# Patient Record
(demographics unavailable — no encounter records)

---
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
## 2017-01-14 NOTE — HISTORY AND PHYSICAL
History of Present Illness


General


Date patient seen:  Jan 14, 2017


Reason for Hospitalization:  Syncope





Present Illness


HPI


69 year old male with hx of HIV, HTN, chronic subdural hematoma, presented to 

ER with complaints of several syncopal episodes


These episodes appear to be related to patient's acute pain and blood pressure.

  He had same type of episode one year ago when his


BP was too low because of hypertensive meds.


Denies any vomiting or diarrhea, He has had decreased oral intake


Denies any chest pain.


He did have near syncopal episode as well feeling lightheaded and dizzy.


Pt is admitted to telemetry for further work up.


Allergies:  


Coded Allergies:  


     ACETAMINOPHEN (Verified  Allergy, Unknown, 3/24/10)


     ASPIRIN (Verified  Allergy, Unknown, 12/7/09)


     HYDROCODONE (Verified  Allergy, Unknown, 3/24/10)


     MORPHINE (Verified  Allergy, Unknown, 12/7/09)





Medication History


Scheduled


Atenolol* (Tenormin*), 50 MG ORAL DAILY, (Reported)


Atenolol* (Tenormin*), 25 MG ORAL DAILY


Clopidogrel Bisulfate* (Plavix*), 75 MG ORAL DAILY


Diazepam* (Valium*), 10 MG PO DAILY, (Reported)


Emtricitabine/Tenofovir (Truvada 200 mg-300 mg Tablet), 1 TAB ORAL DAILY, (

Reported)


Levofloxacin* (Levaquin*), 500 MG ORAL DAILY


Nevirapine (Viramune), 200 MG PO DAILY, (Reported)


Oxycodone Hcl Er* (Oxycontin*), 80 MG ORAL BID, (Reported)


Promethazine Hcl* (Phenergan*), 25 MG ORAL Q6H





Scheduled PRN


Codeine/Promethazine Hcl* (Promethazine-Codeine Syrup*), 5 ML ORAL Q4H PRN for 

For Cough





Patient History


Healthcare decision maker





Resuscitation status





Advanced Directive on File








Past Medical/Surgical History


Past Medical/Surgical History:  


(1) Syncope


(2) Hypertension


(3) Lumbar herniated disc


(4) HIV disease


(5) Acute encephalopathy


(6) Intractable back pain


(7) Chronic subdural hematoma





Review of Systems


Constitutional:  Reports: malaise, weakness





Physical Exam


General Appearance:  cachetic


HEENT:  normocephalic, atraumatic


Neck:  non-tender, normal alignment


Respiratory/Chest:  chest wall non-tender, lungs clear


Cardiovascular/Chest:  normal peripheral pulses, normal rate


Abdomen:  normal bowel sounds, non tender


Genitourinary/Rectal:  normal genital exam


Extremities:  normal range of motion, non-pitting





Last 24 Hour Vital Signs








  Date Time  Temp Pulse Resp B/P Pulse Ox O2 Delivery O2 Flow Rate FiO2


 


1/14/17 15:18 98.1 91 23 91/68 96 Room Air  


 


1/14/17 14:26  92 14 95/63 97 Room Air  


 


1/14/17 13:17 98.8 116 18 92/61 96 Room Air  











Laboratory Tests








Test


  1/14/17


13:35


 


White Blood Count


  15.5 K/UL


(4.8-10.8)  H


 


Red Blood Count


  4.60 M/UL


(4.70-6.10)  L


 


Hemoglobin


  16.1 G/DL


(14.2-18.0)


 


Hematocrit


  47.8 %


(42.0-52.0)


 


Mean Corpuscular Volume


  104 FL (80-99)


H


 


Mean Corpuscular Hemoglobin


  35.0 PG


(27.0-31.0)  H


 


Mean Corpuscular Hemoglobin


Concent 33.7 G/DL


(32.0-36.0)


 


Red Cell Distribution Width


  11.4 %


(11.6-14.8)  L


 


Platelet Count


  249 K/UL


(150-450)


 


Mean Platelet Volume


  5.9 FL


(6.5-10.1)  L


 


Neutrophils (%) (Auto)


  72.4 %


(45.0-75.0)


 


Lymphocytes (%) (Auto)


  19.2 %


(20.0-45.0)  L


 


Monocytes (%) (Auto)


  7.7 %


(1.0-10.0)


 


Eosinophils (%) (Auto)


  0.2 %


(0.0-3.0)


 


Basophils (%) (Auto)


  0.6 %


(0.0-2.0)


 


Sodium Level


  134 mEQ/L


(135-145)  L


 


Potassium Level


  3.8 mEQ/L


(3.4-4.9)


 


Chloride Level


  92 mEQ/L


()  L


 


Carbon Dioxide Level


  23 mEQ/L


(20-30)


 


Anion Gap 19 (5-15)  H


 


Blood Urea Nitrogen


  29 mg/dL


(7-23)  H


 


Creatinine


  2.1 mg/dL


(0.7-1.2)  H


 


Estimat Glomerular Filtration


Rate 38.2 mL/min


(>60)


 


Glucose Level


  104 mg/dL


()


 


Calcium Level


  9.3 mg/dL


(8.6-10.2)


 


Total Bilirubin


  0.5 mg/dL


(0.0-1.2)


 


Aspartate Amino Transf


(AST/SGOT) 90 U/L (5-40)


H


 


Alanine Aminotransferase


(ALT/SGPT) 79 U/L (3-41)


H


 


Alkaline Phosphatase


  67 U/L


()


 


Total Creatine Kinase


  227 U/L


()  H


 


Creatine Kinase MB


  3.7 ng/mL (<


6.7)


 


Creatine Kinase MB Relative


Index 1.6  


 


 


Troponin I


  < 0.30 ng/mL


(<=0.30)


 


Total Protein


  7.3 g/dL


(6.6-8.7)


 


Albumin


  4.3 g/dL


(3.5-5.2)


 


Globulin 3.0 g/dL  


 


Albumin/Globulin Ratio 1.4 (1.0-2.7)  








Height (Feet):  5


Height (Inches):  11.00


Weight (Pounds):  107


Medications





Current Medications








 Medications


  (Trade)  Dose


 Ordered  Sig/Melania


 Route


 PRN Reason  Start Time


 Stop Time Status Last Admin


Dose Admin


 


 Acetaminophen


  (Tylenol)  650 mg  Q4H  PRN


 ORAL


 fever  1/14/17 16:00


 2/13/17 15:59 UNV  


 


 


 Albuterol/


 Ipratropium 3 ml  3 ml  EVERY 4 HOURS  PRN


 HHN


 Shortness of Breath  1/14/17 16:00


 1/19/17 15:59 UNV  


 


 


 Atenolol


  (Tenormin)  25 mg  DAILY


 ORAL


   1/15/17 09:00


 2/14/17 08:59 UNV  


 


 


 Cefepime HCl 2 gm/


 Dextrose  100 ml @ 


 100 mls/hr  EVERY 12  HOURS


 IV


   1/14/17 21:00


 1/21/17 20:59 UNV  


 


 


 Diazepam


  (Valium)  10 mg  DAILY


 ORAL


   1/15/17 09:00


 1/22/17 08:59 UNV  


 


 


 Emtricitabine/


 Tenofovir


  (Truvada)  1 tab  DAILY


 ORAL


   1/15/17 09:00


 2/14/17 08:59 UNV  


 


 


 Heparin Sodium


  (Porcine)


  (Heparin 5000


 units/ml)  5,000 units  EVERY 12  HOURS


 SUBQ


   1/14/17 21:00


 2/13/17 20:59 UNV  


 


 


 Morphine Sulfate


  (Morphine


 Sulfate)  2 mg  EVERY 4 HOURS  PRN


 IVP


 Moderate Pain (Pain Scale 4-6)  1/14/17 16:00


 1/21/17 15:59 UNV  


 


 


 Nevirapine


  (Viramune)  200 mg  DAILY


 ORAL


   1/15/17 09:00


 2/14/17 08:59 UNV  


 


 


 Nitroglycerin


  (Ntg)  0.4 mg  Every 5 Minutes  PRN


 SL


 Prn Chest Pain  1/14/17 16:00


 2/13/17 15:59 UNV  


 


 


 Ondansetron HCl


  (Zofran)  4 mg  Q6H  PRN


 IVP


 Nausea & Vomiting  1/14/17 16:00


 2/13/17 15:59 UNV  


 


 


 Oxycodone HCl


  (OxyCONTIN)  80 mg  BID


 ORAL


   1/14/17 18:00


 1/21/17 17:59 UNV  


 


 


 Polyethylene


 Glycol


  (Miralax)  17 gm  DAILYPRN  PRN


 ORAL


 Constipation  1/14/17 16:00


 2/13/17 15:59 UNV  


 


 


 Promethazine HCl


 25 mg  25 mg  Q6H


 ORAL


   1/14/17 16:00


 2/13/17 15:59 UNV  


 


 


 Promethazine HCl/


 Codeine


  (Phenergan with


 Codeine)  5 ml  Q4H  PRN


 ORAL


 For Cough  1/14/17 16:00


 2/13/17 15:59 UNV  


 


 


 Sodium Chloride


  (Sodium Chloride


 1000ml bag)  1,000 ml @ 


 50 mls/hr  Q20H


 IVLG


   1/15/17 00:21


 2/14/17 00:20 UNV  


 


 


 Temazepam


  (Restoril)  15 mg  HSPRN  PRN


 ORAL


 Insomnia  1/14/17 16:00


 1/21/17 15:59 UNV  


 


 


 Vancomycin HCl/


 Dextrose


  (Vancomycin/D5W


 250ml)  275 ml @ 


 183.3 mls/


 hr  Q24H


 IV


   1/15/17 00:30


 1/20/17 00:29 UNV  


 











Assessment/Plan


Problem List:  


(1) Acute encephalopathy


ICD Codes:  G93.40 - Encephalopathy, unspecified


SNOMED:  7072143


(2) Hypertension


ICD Codes:  I10 - Hypertension


SNOMED:  56492571


Qualifiers:  


   Qualified Codes:  I10 - Essential (primary) hypertension


(3) HIV disease


ICD Codes:  B20 - HIV disease


SNOMED:  20422387


(4) Intractable back pain


ICD Codes:  M54.9 - Intractable back pain


SNOMED:  264377250


(5) Chronic subdural hematoma


ICD Codes:  I62.03 - Nontraumatic chronic subdural hemorrhage


SNOMED:  21613316


(6) Lumbar herniated disc


ICD Codes:  M51.26 - Lumbar herniated disc


SNOMED:  058484172


Assessment/Plan


telemetry monitoring


BP monitoring


Neuro evaluation


serial ekg, troponin


echo


pain control











BELLE BATISTA Jan 14, 2017 16:06

## 2017-01-15 NOTE — CONSULTATION
Consult Note


Consult Note





ID CONSULT:  Dict# [id number]


Assessment/Plan





ASSESSMENT:  70 y/o male with:





// HIV(+), on cART ( truvada, nevirapine ) - CD4 382(19%), VL undetectable 3/

2016


// Leukocytosis - resolved, afebrile.  Suspect stress response, dehydration, 

possible rib fracture. No localizing s/sx infection








// Syncope SP fall - troponin neg x1


    - MRI 4/2016: No acute infarct. Possible small infarct involving the left 

lateral cerebellar hemisphere, unchanged. Stable extra-axial fluid collections 

overlying the frontoparietal lobes bilaterally, possibly chronic subdural 

hygromas. Chronic microangiopathic ischemic changes. Age-related atrophy.


// Left anterior hemithorax pain r/o rib fracture - CXR pending


// Elevated LFTs / transaminitis - mild, asymptomatic, h/o gallstones, HCV(-) 

2015


// ARF - improved


// Chronic LBP, lumbar stenosis


// Negative coccidioides serology, CrAg 2009


// Negative MRSA screen


// No ABX allergies


// Full Code








PLAN:





- DC IV vancomycin, cefepime d# 1, monitor pt off of ABX. Doubt active 

infectious process


- continue cART ( truvada, nevirapine )


- f/u CXR


- monitor CBC, temperatures, panculture if acute change


- monitor BMP





Thanks!  WIll follow











NEETA CASILLAS Brandon 15, 2017 09:16

## 2017-01-15 NOTE — CARDIOLOGY REPORT
--------------- APPROVED REPORT --------------





EKG Measurement

Heart [id number]

MA 140P75

[id number]

[id number]

[id number]





Sinus tachycardia

Otherwise normal ECG

## 2017-01-15 NOTE — NEUROLOGY PROGRESS NOTE
Objective


Physical Exam





Last Vital Signs








  Date Time  Temp Pulse Resp B/P Pulse Ox O2 Delivery O2 Flow Rate FiO2


 


1/15/17 12:28 97.2 58 18 106/69 99 Room Air  











Laboratory Tests








Test


  1/15/17


05:50


 


White Blood Count


  9.1 K/UL


(4.8-10.8)


 


Red Blood Count


  3.97 M/UL


(4.70-6.10)  L


 


Hemoglobin


  13.8 G/DL


(14.2-18.0)  L


 


Hematocrit


  41.7 %


(42.0-52.0)  L


 


Mean Corpuscular Volume


  105 FL (80-99)


H


 


Mean Corpuscular Hemoglobin


  34.8 PG


(27.0-31.0)  H


 


Mean Corpuscular Hemoglobin


Concent 33.1 G/DL


(32.0-36.0)


 


Red Cell Distribution Width


  11.4 %


(11.6-14.8)  L


 


Platelet Count


  191 K/UL


(150-450)


 


Mean Platelet Volume


  6.2 FL


(6.5-10.1)  L


 


Neutrophils (%) (Auto)


  55.8 %


(45.0-75.0)


 


Lymphocytes (%) (Auto)


  33.4 %


(20.0-45.0)


 


Monocytes (%) (Auto)


  8.3 %


(1.0-10.0)


 


Eosinophils (%) (Auto)


  1.7 %


(0.0-3.0)


 


Basophils (%) (Auto)


  0.8 %


(0.0-2.0)


 


Sodium Level


  138 mEQ/L


(135-145)


 


Potassium Level


  4.1 mEQ/L


(3.4-4.9)


 


Chloride Level


  97 mEQ/L


()  L


 


Carbon Dioxide Level


  28 mEQ/L


(20-30)


 


Anion Gap 13 (5-15)  


 


Blood Urea Nitrogen


  35 mg/dL


(7-23)  H


 


Creatinine


  1.7 mg/dL


(0.7-1.2)  H


 


Estimat Glomerular Filtration


Rate 48.7 mL/min


(>60)


 


Glucose Level


  91 mg/dL


()


 


Calcium Level


  8.5 mg/dL


(8.6-10.2)  L


 


Total Bilirubin


  0.2 mg/dL


(0.0-1.2)


 


Aspartate Amino Transf


(AST/SGOT) 36 U/L (5-40)  


 


 


Alanine Aminotransferase


(ALT/SGPT) 49 U/L (3-41)


H


 


Alkaline Phosphatase


  64 U/L


()


 


Total Protein


  5.9 g/dL


(6.6-8.7)  L


 


Albumin


  3.4 g/dL


(3.5-5.2)  L


 


Globulin 2.5 g/dL  


 


Albumin/Globulin Ratio 1.3 (1.0-2.7)  











Impression/Recommendations


Recommendations


#[id number]











JONNIE HOFFMAN Brandon 15, 2017 14:06

## 2017-01-16 NOTE — CONSULTATION
DATE OF CONSULTATION:  01/15/2017



INFECTIOUS DISEASE CONSULTATION:



CONSULTING PHYSICIAN:  Paco Ramos M.D.



REFERRING PHYSICIAN:  Adriana Arreola M.D.



REASON FOR CONSULTATION:  Human immunodeficiency virus and

leukocytosis.



HISTORY OF PRESENT ILLNESS:  This is a 69-year-old male with a

history of human immunodeficiency virus virologically suppressed with

anti-retroviral therapy.  Admitted on 01/14/2017 with syncope, status post

fall.  The patient fell on his left hemithorax and is complaining of

anterior rib pain.  A chest x-ray is pending.  The patient attributes

syncope to hypotension and antihypertensive use.  Denied fevers, chills,

night sweats, headache, dizziness, cough, congestion, abdominal pain,

nausea, vomiting, diarrhea, or dysuria.  Evidence of leukocytosis, now

resolved, acute renal insufficiency, improved, and elevated LFTs with

transaminitis, improved.  No cultures has been sent.  The patient has been

started on empiric vancomycin and cefepime.  His antiretroviral will be

continued.  ID now consulted to assist in management.



PAST MEDICAL HISTORY:

1. Human immunodeficiency virus, virologically suppressed with Truvada,

nevirapine.  CD4 count was 382 in March of 2016.

2. Lumbar stenosis.

3. Chronic low back pain.

4. Chronic bilateral frontoparietal subdural hygromas.

5. Grade 1 diastolic dysfunction.

6. Diverticulosis.

7. Cholelithiasis.

8. Atherosclerosis.



PAST SURGICAL HISTORY:  Spinal surgery.



FAMILY HISTORY:  Noncontributory.



SOCIAL HISTORY:  No active tobacco, alcohol, or illicit drug abuse.



ALLERGIES:

1. Aspirin.

2. Tylenol.

3. Morphine.

4. Hydrocodone.



MEDICATIONS:

1. Vancomycin day #1.

2. Cefepime day #1.

3. Truvada.

4. Nevirapine.

5. Atenolol.

6. Valium.

7. Subcutaneous heparin.



REVIEW OF SYSTEMS:  As per history of present illness.  Ten systems

reviewed.  All pertinent positives and negatives noted.



PHYSICAL EXAMINATION:

VITAL SIGNS:  Maximum temperature 98.8, blood pressure 103/63, heart

rate 70, respiratory rate 18, and saturating 95% on room air.

GENERAL:  No apparent distress.  Nontoxic appearing.

HEENT:  No thrush.  Edentulous in the maxillary and poor dentition in

the mandible.  No carotid bruit.

CARDIOVASCULAR:  Regular rate and rhythm.  No murmurs.

PULMONARY:  Clear to auscultation bilaterally.

ABDOMINAL:  Bowel sounds present.  Soft, nondistended.  Left anterior

hemithorax rib pain.

EXTREMITIES:  No edema.

SKIN:  No rash.



LABORATORY DATA:  White blood cell count 9.1 decreased from 15.5,

hemoglobin 13.8, and platelets 191,000.  Sodium 138, potassium 4.1,

chloride 97, bicarbonate 28, BUN 35, creatinine 1.7 decreased from 2.1.

AST 36, ALT 49, alkaline phosphatase 64, total bilirubin 0.2, albumin 3.4.

Troponin negative x1.



MICROBIOLOGY:  None.



IMAGING:  On 01/14/2017, chest x-ray pending.



ASSESSMENT:

1. Human immunodeficiency virus positive, on antiretroviral therapy with

Truvada, nevirapine and a CD4 count of 382, and virologically undetectable

as of March of 2016.

2. Leukocytosis, resolved and afebrile.  Suspect stress response,

dehydration, and possible rib fracture.  No localizing signs or symptoms

of infection.

3. Syncope, status post fall.  Troponins are negative x1.  MRI in April, 2015 showed a possible small infarct in the left lateral cerebellar

hemisphere and stable extra-axial fluid collections overlying the frontal

parietal lobes bilaterally, chronic microangiopathic ischemic changes and

age-related atrophy.

4. Left anterior hemithorax pain, rule out rib fracture.  Chest x-ray is

pending.

5. Elevated liver function tests versus transaminitis, mild and

asymptomatic.  He has a history of gallstones and hepatitis C serology was

negative in 2015.

6. Acute renal failure, improved.

7. Chronic low back pain and lumbar stenosis.

8. Negative Coccidioides and cryptococcal antigen in 2009.

9. Negative methicillin-resistant Staphylococcus aureus screen.

10. No antibiotic allergies.

11. Full Code.



PLAN:

1. Discontinue intravenous vancomycin and cefepime and monitor the

patient off of antibiotics.  Doubt active infectious process.

2. Continue Ensure via therapy with Truvada and nevirapine.

3. Follow up chest x-ray.

4. Monitor CBC and temperatures and panculture if acute change.

5. Monitor BMP.



Thank you.  We will follow.









  ______________________________________________

  Paco Ramos M.D.





DR:  KALI

D:  01/15/2017 09:37

T:  01/16/2017 05:25

JOB#:  [id number]

CC:  Adriana Arreola M.D.; Fax#:  [phone] Smith, M.D.
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
## 2017-01-17 NOTE — DISCHARGE SUMMARY
Discharge Summary


Hospital Course


Date of Admission


Jan 14, 2017 at 14:07


Date of Discharge


Jan 16, 2017 at 13:56


Admitting Diagnosis


SYNCOPE


HPI


Anupam Ramos is a 69 year old male who was admitted on Jan 14, 2017 at 14:07 

for Syncope


Hospital Course


dc summary dictated #[id number]





Discharge Medications


Continued Medications:  


Clopidogrel Bisulfate* (Plavix*) 75 Mg Tablet


75 MG ORAL DAILY, #30 TAB





Codeine/Promethazine Hcl* (Promethazine-Codeine Syrup*) 5 Ml Udc


5 ML ORAL Q4H PRN for For Cough, #12 OZ





Diazepam* (Valium*) 5 Mg Tablet


10 MG PO DAILY, #21 TAB





Emtricitabine/Tenofovir (Truvada 200 mg-300 mg Tablet) 1 Tab Tab


1 TAB ORAL DAILY





Nevirapine (Viramune) 200 Mg Tablet


200 MG PO DAILY





Oxycodone Hcl Er* (Oxycontin*) 80 Mg Tab.er.12h


80 MG ORAL BID, TAB











Discharge


Condition Upon Discharge:  improving, stable


Discharge Disposition


Patient was discharged to Home (01)


Discharge Diagnoses:  





Discharge Instructions


Discharge Instructions


Special Instructions


I have been assigned to complete a D/C Summary on this account. I was not 

involved in the patient management











Natalie Govea NP ([facility]) Jan 17, 2017 13:05

## 2017-01-18 NOTE — DISCHARGE SUMMARY
DATE OF ADMISSION:  01/14/2017



DATE OF DISCHARGE:  01/16/2017



ADMITTING DOCTOR:  Adriana Arreola M.D.



REASON FOR HOSPITALIZATION:  69-year-old male with

history of human immunodeficiency virus, hypertension, and other multiple

medical issues presented to ED.  He was reported to be fairly well until the 
last few days

when he started to develop generalized weakness and   had few episodes

of falls, which he attributed to clumsiness and slipping.  However, on the 
third event, he

lost consciousness and fell down hitting his ribs.  When he woke up, there

was no tongue biting and no incontinence.  He was brought to the emergency

room for further evaluation.  Vital signs on admission were with evidence

of tachycardia -116 and blood pressure was -92/61.  He was afebrile.

Laboratory work  revealed elevated WBC-15.5;  BUN was 29 and creatinine 2.1.  
Anion gap 19.

Elevated liver enzymes:  AST 90, ALT 79, and total cholesterol of 227.

Sodium 134.  In the emergency room, the patient's troponin was negative.

Chest x-ray revealed no acute disease.

The patient was admitted for further management due to the leukocytosis

and workup for syncope. 



ADMITTING DIAGNOSES:   



1. Syncope.

2. Leukocytosis.

3. Acute renal failure.

4. Human immunodeficiency virus status.

5. Elevated transaminase  

6. Intractable low back pain.



HOSPITAL COURSE:  The patient is admitted to telemetry floor.

The patient was started on IV hydration.  Nephrologist, neurologist, and ID all

seen the patient.  Troponin was negative.  EKG revealed no ST changes.  No 
ectopy.

Antihypertensive medications were discontinued.  No evidence of arrhythmia on 
the EKG or monitor.

Plavix was continued.  No aspirin since the patient   allergic to it, and no 
statin for now 

since the patient had  elevated LFT.

The patient was hydrated. Renal parameters improved slightly with

hydration.  Creatinine down to 1.7.  Nephrologist followed the patient.

Initially, the patient was on antibiotics.  ID discontinued antibiotics.

Leukocytosis resolved.  No fever.  ID doubt any acute infectious process

and recommended to observe the patient off antibiotics.  HAART therapy was

resumed for HIV.  The patient was working with physical and occupational

therapy.  Per Neurology, new onset of recurrent syncope likely secondary

to drug-induced hypotension.  This, as mentioned above, all antihypertensive

medication were discontinued.  The patient initially exhibited  some change   in

mental status,  likely secondary to dehydration, which resolved after gentle 
hydration.   

LFT monitored, patient with history of hepatitis C, no statin for now.  

Mental status back to baseline, renal parameters improved, blood pressure stable
, all antihypertensive medications on hold. 

Patient was cleared for  discharge . 



FINAL DIAGNOSES:   



1. Acute encephalopathy likely related to dehydration, resolved.

2. New onset of recurrent syncope, status post fall, likely secondary to

drug-induced hypotension.

3. Acute renal failure on chronic kidney disease, likely secondary to

dehydration  , improving.

4. Human immunodeficiency virus status, on HAART therapy.

5. Elevated liver function tests, monitored, trending down.

6. Intractable back pain, opioid dependency.

7. Lumbar herniated disc, status post laminotomy.

8. Lumbar stenosis.

9. Chronic subdural hematoma.



DISCHARGE MEDICATIONS:  See medication reconciliation list.



DISCHARGE INSTRUCTIONS:  The patient to follow up with the primary

medical doctor. patient will eventually need optimization of antihypertensive 
regimen, 

for now all antihypertensive medications on hold. Patient to see primary care 
provider in 1 week. 







  ______________________________________________

  Adriana Arreola M.D.



  ______________________________________________

  Natalie Govea ([facility] NCATHY





DR:  NILES

D:  01/17/2017 13:05

T:  01/18/2017 04:21

JOB#:  [id number]

CC:



BHANU
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
## 2018-01-26 NOTE — DIAGNOSTIC IMAGING REPORT
Indication: Cough

 

Technique: One view of the chest

 

Comparison: 1/14/2017

 

Findings: The lungs and pleural spaces are clear. The heart size is normal. The aorta

is tortuous and ectatic. The bones are unremarkable.

 

Impression: No acute process

## 2024-01-23 NOTE — PULMONOLOGY PROGRESS NOTE
Assessment/Plan


Problems:  


(1) Intractable back pain


(2) Acute encephalopathy


(3) Chronic subdural hematoma


(4) Lumbar herniated disc


(5) HIV disease


(6) Hypertension


Assessment/Plan





wants to be seen by Id disease


telemetry monitoring


BP monitoring


Neuro evaluation


serial ekg, troponin


echo


pain control


ok to dc home, off bp meds


f.u as outpatient





Subjective


Interval Events:  doing better


Allergies:  


Coded Allergies:  


     ACETAMINOPHEN (Verified  Allergy, Unknown, 3/24/10)


     ASPIRIN (Verified  Allergy, Unknown, 12/7/09)


     HYDROCODONE (Verified  Allergy, Unknown, 3/24/10)


     MORPHINE (Verified  Allergy, Unknown, 12/7/09)





Objective





Last 24 Hour Vital Signs








  Date Time  Temp Pulse Resp B/P Pulse Ox O2 Delivery O2 Flow Rate FiO2


 


1/15/17 12:28 97.2 58 18 106/69 99 Room Air  


 


1/15/17 12:03 96.9       


 


1/15/17 12:00  64      


 


1/15/17 09:13  75  103/63    


 


1/15/17 08:00  65      


 


1/15/17 08:00  75 18   Room Air  


 


1/15/17 07:50 96.9 56 18 103/63 95 Room Air  


 


1/15/17 05:00 97.0 85 20 95/59 95 Room Air  


 


1/15/17 04:27 97.0 85 20 85/54 95 Room Air  


 


1/15/17 04:00  77      


 


1/14/17 20:00 97.8 90 18 102/62 97 Room Air  


 


1/14/17 20:00  88      


 


1/14/17 19:26  96 18   Room Air  


 


1/14/17 16:25 97.2 69 19 104/75 96 Room Air  


 


1/14/17 16:12 98.1 89 21 97/69 97 Room Air  


 


1/14/17 15:18 98.1 91 23 91/68 96 Room Air  


 


1/14/17 14:26  92 14 95/63 97 Room Air  


 


1/14/17 13:17 98.8 116 18 92/61 96 Room Air  

















Intake and Output  


 


 1/14/17 1/15/17





 19:00 07:00


 


Intake Total 0 ml 1125.0 ml


 


Balance 0 ml 1125.0 ml


 


  


 


Intake Oral 0 ml 400 ml


 


IV Total  725.0 ml


 


# Voids  1








General Appearance:  cachetic


HEENT:  normocephalic, atraumatic


Respiratory/Chest:  chest wall non-tender, lungs clear


Cardiovascular:  normal peripheral pulses, normal rate


Abdomen:  normal bowel sounds, soft, non tender


Extremities:  no cyanosis


Neurologic/Psychiatric:  CNs II-XII grossly normal


Lymphatic:  no neck adenopathy


Laboratory Tests


1/14/17 13:35: 


White Blood Count 15.5H, Red Blood Count 4.60L, Hemoglobin 16.1, Hematocrit 47.8

, Mean Corpuscular Volume 104H, Mean Corpuscular Hemoglobin 35.0H, Mean 

Corpuscular Hemoglobin Concent 33.7, Red Cell Distribution Width 11.4L, 

Platelet Count 249, Mean Platelet Volume 5.9L, Neutrophils (%) (Auto) 72.4, 

Lymphocytes (%) (Auto) 19.2L, Monocytes (%) (Auto) 7.7, Eosinophils (%) (Auto) 

0.2, Basophils (%) (Auto) 0.6, Sodium Level 134L, Potassium Level 3.8, Chloride 

Level 92L, Carbon Dioxide Level 23, Anion Gap 19H, Blood Urea Nitrogen 29H, 

Creatinine 2.1H, Estimat Glomerular Filtration Rate 38.2, Glucose Level 104, 

Calcium Level 9.3, Total Bilirubin 0.5, Aspartate Amino Transf (AST/SGOT) 90H, 

Alanine Aminotransferase (ALT/SGPT) 79H, Alkaline Phosphatase 67, Total 

Creatine Kinase 227H, Creatine Kinase MB 3.7, Creatine Kinase MB Relative Index 

1.6, Troponin I < 0.30, Total Protein 7.3, Albumin 4.3, Globulin 3.0, Albumin/

Globulin Ratio 1.4


1/15/17 05:50: 


White Blood Count 9.1, Red Blood Count 3.97L, Hemoglobin 13.8L, Hematocrit 41.7L

, Mean Corpuscular Volume 105H, Mean Corpuscular Hemoglobin 34.8H, Mean 

Corpuscular Hemoglobin Concent 33.1, Red Cell Distribution Width 11.4L, 

Platelet Count 191, Mean Platelet Volume 6.2L, Neutrophils (%) (Auto) 55.8, 

Lymphocytes (%) (Auto) 33.4, Monocytes (%) (Auto) 8.3, Eosinophils (%) (Auto) 

1.7, Basophils (%) (Auto) 0.8, Sodium Level 138, Potassium Level 4.1, Chloride 

Level 97L, Carbon Dioxide Level 28, Anion Gap 13, Blood Urea Nitrogen 35H, 

Creatinine 1.7H, Estimat Glomerular Filtration Rate 48.7, Glucose Level 91, 

Calcium Level 8.5L, Total Bilirubin 0.2, Aspartate Amino Transf (AST/SGOT) 36, 

Alanine Aminotransferase (ALT/SGPT) 49H, Alkaline Phosphatase 64, Total Protein 

5.9L, Albumin 3.4L, Globulin 2.5, Albumin/Globulin Ratio 1.3





Current Medications








 Medications


  (Trade)  Dose


 Ordered  Sig/Melania


 Route


 PRN Reason  Start Time


 Stop Time Status Last Admin


Dose Admin


 


 Acetaminophen


  (Tylenol)  650 mg  Q4H  PRN


 ORAL


 fever  1/14/17 16:00


 2/13/17 15:59   


 


 


 Albuterol/


 Ipratropium


  (DuoNeb


 0.5-3(2.5)mg/3ml)  3 ml  EVERY 4 HOURS  PRN


 HHN


 Shortness of Breath  1/14/17 16:00


 1/19/17 15:59   


 


 


 Atenolol


  (Tenormin)  25 mg  DAILY


 ORAL


   1/15/17 09:00


 2/14/17 08:59  1/15/17 09:13


 


 


 Diazepam


  (Valium)  10 mg  DAILY


 ORAL


   1/15/17 09:00


 1/22/17 08:59  1/15/17 09:13


 


 


 Emtricitabine/


 Tenofovir


  (Truvada)  1 tab  DAILY


 ORAL


   1/15/17 09:00


 2/14/17 08:59 UNV  


 


 


 Heparin Sodium


  (Porcine)


  (Heparin 5000


 units/ml)  5,000 units  EVERY 12  HOURS


 SUBQ


   1/14/17 21:00


 2/13/17 20:59  1/15/17 09:15


 


 


 Hydromorphone HCl


  (Dilaudid)  2 mg  Q4H  PRN


 IVP


 Moderate Pain (Pain Scale 4-6)  1/14/17 18:00


 1/21/17 17:59  1/15/17 11:33


 


 


 Nevirapine


  (Viramune)  200 mg  DAILY


 ORAL


   1/15/17 09:00


 2/14/17 08:59 UNV  


 


 


 Nitroglycerin


  (Ntg)  0.4 mg  Q5M  PRN


 SL


 Prn Chest Pain  1/14/17 16:00


 2/13/17 15:59   


 


 


 Ondansetron HCl


  (Zofran)  4 mg  Q6H  PRN


 IVP


 Nausea & Vomiting  1/14/17 16:00


 2/13/17 15:59   


 


 


 Oxycodone HCl 80


 mg  80 mg  Q12HR


 ORAL


   1/14/17 21:00


 1/21/17 20:59  1/15/17 09:13


 


 


 Polyethylene


 Glycol


  (Miralax)  17 gm  DAILYPRN  PRN


 ORAL


 Constipation  1/14/17 16:00


 2/13/17 15:59   


 


 


 Promethazine HCl/


 Codeine


  (Phenergan with


 Codeine)  5 ml  Q4H  PRN


 ORAL


 For Cough  1/14/17 16:00


 2/13/17 15:59   


 


 


 Sodium Chloride


  (Sodium Chloride


 1000ml bag)  1,000 ml @ 


 50 mls/hr  Q20H


 IVLG


   1/14/17 18:00


 2/13/17 17:59  1/15/17 12:11


 


 


 Temazepam


  (Restoril)  15 mg  HSPRN  PRN


 ORAL


 Insomnia  1/14/17 16:00


 1/21/17 15:59   


 

















BELLE BATISTA Brandon 15, 2017 12:56
Assessment/Plan


Problems:  


(1) Intractable back pain


(2) Acute encephalopathy


(3) Chronic subdural hematoma


(4) Lumbar herniated disc


(5) HIV disease


(6) Hypertension


Assessment/Plan


dc with ID and neuro


telemetry monitoring


BP stable


Neuro evaluation appreciated


serial ekg, troponin are negative


pain control


ok to dc home, off bp meds


f.u as outpatient





Subjective


Interval Events:  no new complains, doing better, no other symptoms


Allergies:  


Coded Allergies:  


     ACETAMINOPHEN (Verified  Allergy, Unknown, 3/24/10)


     ASPIRIN (Verified  Allergy, Unknown, 12/7/09)


     HYDROCODONE (Verified  Allergy, Unknown, 3/24/10)


     MORPHINE (Verified  Allergy, Unknown, 12/7/09)





Objective





Last 24 Hour Vital Signs








  Date Time  Temp Pulse Resp B/P Pulse Ox O2 Delivery O2 Flow Rate FiO2


 


1/16/17 08:00 97.0 79 17 119/85 97 Room Air  





  69      


 


1/16/17 08:00 97.6 63 17 119/85 97 Room Air  


 


1/16/17 08:00  60      


 


1/16/17 07:17  59 16   Room Air  


 


1/16/17 04:21 97.7 71 20 114/66 100 Nasal Cannula  


 


1/16/17 04:00  73      


 


1/16/17 00:10 98.0 65 20 111/67 97 Room Air  


 


1/16/17 00:00  66      


 


1/15/17 22:21 97.8       


 


1/15/17 20:00 97.8 70 18 112/71 97 Room Air  


 


1/15/17 20:00  65      


 


1/15/17 16:00  57      


 


1/15/17 16:00 98.1 60 18 122/85 96 Room Air  

















Intake and Output  


 


 1/15/17 1/16/17





 19:00 07:00


 


Intake Total 1200 ml 750 ml


 


Balance 1200 ml 750 ml


 


  


 


Intake Oral 650 ml 350 ml


 


IV Total 550 ml 400 ml


 


# Voids 2 2


 


# Bowel Movements 1 








General Appearance:  cachetic


HEENT:  normocephalic, atraumatic


Respiratory/Chest:  chest wall non-tender, lungs clear


Genitourinary:  normal external genitalia


Neurologic/Psychiatric:  CNs II-XII grossly normal


Lymphatic:  no neck adenopathy


Laboratory Tests


1/16/17 11:20: Urine Random Sodium 14





Current Medications








 Medications


  (Trade)  Dose


 Ordered  Sig/Melania


 Route


 PRN Reason  Start Time


 Stop Time Status Last Admin


Dose Admin


 


 Acetaminophen


  (Tylenol)  650 mg  Q4H  PRN


 ORAL


 fever  1/14/17 16:00


 2/13/17 15:59   


 


 


 Albuterol/


 Ipratropium


  (DuoNeb


 0.5-3(2.5)mg/3ml)  3 ml  EVERY 4 HOURS  PRN


 HHN


 Shortness of Breath  1/14/17 16:00


 1/19/17 15:59   


 


 


 Diazepam


  (Valium)  10 mg  DAILY


 ORAL


   1/15/17 09:00


 1/22/17 08:59  1/16/17 08:37


 


 


 Heparin Sodium


  (Porcine)


  (Heparin 5000


 units/ml)  5,000 units  EVERY 12  HOURS


 SUBQ


   1/14/17 21:00


 2/13/17 20:59  1/16/17 08:43


 


 


 Hydromorphone HCl


 2 mg  2 mg  Q3H  PRN


 IVP


 Moderate Pain (Pain Scale 4-6)  1/15/17 15:00


 1/22/17 14:59  1/16/17 10:02


 


 


 Nitroglycerin


  (Ntg)  0.4 mg  Q5M  PRN


 SL


 Prn Chest Pain  1/14/17 16:00


 2/13/17 15:59   


 


 


 Ondansetron HCl


  (Zofran)  4 mg  Q6H  PRN


 IVP


 Nausea & Vomiting  1/14/17 16:00


 2/13/17 15:59   


 


 


 Oxycodone HCl


  (OxyCONTIN)  80 mg  Q12HR


 ORAL


   1/14/17 21:00


 1/21/17 20:59  1/16/17 08:38


 


 


 Patient Own


 Medication


  (Patient's Own


 Med)  1 ea  DAILY


 ORAL


   1/15/17 19:00


 2/14/17 18:59  1/16/17 08:55


 


 


 Patient Own


 Medication


  (Patient's Own


 Med)  1 ea  DAILY


 ORAL


   1/15/17 19:00


 2/14/17 18:59  1/16/17 08:55


 


 


 Polyethylene


 Glycol


  (Miralax)  17 gm  DAILYPRN  PRN


 ORAL


 Constipation  1/14/17 16:00


 2/13/17 15:59   


 


 


 Promethazine HCl/


 Codeine


  (Phenergan with


 Codeine)  5 ml  Q4H  PRN


 ORAL


 For Cough  1/14/17 16:00


 2/13/17 15:59   


 


 


 Sodium Chloride


  (Sodium Chloride


 1000ml bag)  1,000 ml @ 


 75 mls/hr  [id number]


 IVLG


   1/16/17 10:30


 2/15/17 10:29  1/16/17 10:49


 


 


 Temazepam


  (Restoril)  15 mg  HSPRN  PRN


 ORAL


 Insomnia  1/14/17 16:00


 1/21/17 15:59   


 

















BELLE BATISTA Jan 16, 2017 13:03
No